# Patient Record
Sex: MALE | URBAN - METROPOLITAN AREA
[De-identification: names, ages, dates, MRNs, and addresses within clinical notes are randomized per-mention and may not be internally consistent; named-entity substitution may affect disease eponyms.]

---

## 2019-05-13 ENCOUNTER — PROCEDURE (OUTPATIENT)
Dept: URBAN - METROPOLITAN AREA CLINIC 17 | Facility: CLINIC | Age: 39
End: 2019-05-13
Payer: COMMERCIAL

## 2019-05-13 DIAGNOSIS — T15.01XA FOREIGN BODY IN CORNEA, RIGHT EYE, INITIAL ENCOUNTER: Primary | ICD-10-CM

## 2019-05-13 PROCEDURE — 65222 REMOVE FOREIGN BODY FROM EYE: CPT | Performed by: OPTOMETRIST

## 2019-05-13 ASSESSMENT — INTRAOCULAR PRESSURE
OS: 18
OD: 16

## 2019-05-13 NOTE — IMPRESSION/PLAN
Impression: Foreign body in cornea, right eye, initial encounter: T15.01xA. Plan: Removed FB from cornea, procedure tolerated well, continue to use ABX gtts given at urgent care.

## 2023-08-28 ENCOUNTER — LAB (OUTPATIENT)
Dept: LAB | Facility: LAB | Age: 43
End: 2023-08-28
Payer: COMMERCIAL

## 2023-08-29 LAB — TOBACCO SCREEN, URINE: NEGATIVE

## 2023-10-12 ENCOUNTER — ANCILLARY PROCEDURE (OUTPATIENT)
Dept: RADIOLOGY | Facility: CLINIC | Age: 43
End: 2023-10-12
Payer: COMMERCIAL

## 2023-10-12 ENCOUNTER — COMMUNITY ORDERS (OUTPATIENT)
Age: 43
End: 2023-10-12
Payer: COMMERCIAL

## 2023-10-12 DIAGNOSIS — S67.192A CRUSHING INJURY OF RIGHT MIDDLE FINGER, INITIAL ENCOUNTER: Primary | ICD-10-CM

## 2023-10-12 PROCEDURE — 73130 X-RAY EXAM OF HAND: CPT | Mod: RT

## 2024-05-21 ENCOUNTER — LAB (OUTPATIENT)
Dept: LAB | Facility: LAB | Age: 44
End: 2024-05-21
Payer: COMMERCIAL

## 2024-05-21 DIAGNOSIS — E55.9 VITAMIN D DEFICIENCY, UNSPECIFIED: ICD-10-CM

## 2024-05-21 DIAGNOSIS — R61 GENERALIZED HYPERHIDROSIS: ICD-10-CM

## 2024-05-21 DIAGNOSIS — F31.61 BIPOLAR DISORDER, CURRENT EPISODE MIXED, MILD (MULTI): Primary | ICD-10-CM

## 2024-05-21 LAB
ALBUMIN SERPL BCP-MCNC: 4.6 G/DL (ref 3.4–5)
ALP SERPL-CCNC: 61 U/L (ref 33–120)
ALT SERPL W P-5'-P-CCNC: 43 U/L (ref 10–52)
ANION GAP SERPL CALC-SCNC: 10 MMOL/L (ref 10–20)
AST SERPL W P-5'-P-CCNC: 30 U/L (ref 9–39)
BASOPHILS # BLD AUTO: 0.03 X10*3/UL (ref 0–0.1)
BASOPHILS NFR BLD AUTO: 0.5 %
BILIRUB SERPL-MCNC: 0.8 MG/DL (ref 0–1.2)
BUN SERPL-MCNC: 13 MG/DL (ref 6–23)
CALCIUM SERPL-MCNC: 8.9 MG/DL (ref 8.6–10.3)
CHLORIDE SERPL-SCNC: 104 MMOL/L (ref 98–107)
CHOLEST SERPL-MCNC: 141 MG/DL (ref 0–199)
CHOLESTEROL/HDL RATIO: 4.8
CO2 SERPL-SCNC: 27 MMOL/L (ref 21–32)
CREAT SERPL-MCNC: 1.21 MG/DL (ref 0.5–1.3)
CRP SERPL-MCNC: 0.41 MG/DL
EGFRCR SERPLBLD CKD-EPI 2021: 77 ML/MIN/1.73M*2
EOSINOPHIL # BLD AUTO: 0.13 X10*3/UL (ref 0–0.7)
EOSINOPHIL NFR BLD AUTO: 2.4 %
ERYTHROCYTE [DISTWIDTH] IN BLOOD BY AUTOMATED COUNT: 13.6 % (ref 11.5–14.5)
GLUCOSE SERPL-MCNC: 95 MG/DL (ref 74–99)
HCT VFR BLD AUTO: 50.3 % (ref 41–52)
HDLC SERPL-MCNC: 29.5 MG/DL
HGB BLD-MCNC: 16.5 G/DL (ref 13.5–17.5)
IMM GRANULOCYTES # BLD AUTO: 0.03 X10*3/UL (ref 0–0.7)
IMM GRANULOCYTES NFR BLD AUTO: 0.5 % (ref 0–0.9)
LDLC SERPL CALC-MCNC: 77 MG/DL
LYMPHOCYTES # BLD AUTO: 1.77 X10*3/UL (ref 1.2–4.8)
LYMPHOCYTES NFR BLD AUTO: 32.1 %
MCH RBC QN AUTO: 29.5 PG (ref 26–34)
MCHC RBC AUTO-ENTMCNC: 32.8 G/DL (ref 32–36)
MCV RBC AUTO: 90 FL (ref 80–100)
MONOCYTES # BLD AUTO: 0.45 X10*3/UL (ref 0.1–1)
MONOCYTES NFR BLD AUTO: 8.2 %
NEUTROPHILS # BLD AUTO: 3.11 X10*3/UL (ref 1.2–7.7)
NEUTROPHILS NFR BLD AUTO: 56.3 %
NON HDL CHOLESTEROL: 112 MG/DL (ref 0–149)
NRBC BLD-RTO: 0 /100 WBCS (ref 0–0)
PLATELET # BLD AUTO: 229 X10*3/UL (ref 150–450)
POTASSIUM SERPL-SCNC: 4.4 MMOL/L (ref 3.5–5.3)
PROT SERPL-MCNC: 7 G/DL (ref 6.4–8.2)
RBC # BLD AUTO: 5.6 X10*6/UL (ref 4.5–5.9)
SODIUM SERPL-SCNC: 137 MMOL/L (ref 136–145)
TRIGL SERPL-MCNC: 171 MG/DL (ref 0–149)
TSH SERPL-ACNC: 1.96 MIU/L (ref 0.44–3.98)
VLDL: 34 MG/DL (ref 0–40)
WBC # BLD AUTO: 5.5 X10*3/UL (ref 4.4–11.3)

## 2024-05-21 PROCEDURE — 84436 ASSAY OF TOTAL THYROXINE: CPT

## 2024-05-21 PROCEDURE — 84480 ASSAY TRIIODOTHYRONINE (T3): CPT

## 2024-05-21 PROCEDURE — 80175 DRUG SCREEN QUAN LAMOTRIGINE: CPT

## 2024-05-21 PROCEDURE — 36415 COLL VENOUS BLD VENIPUNCTURE: CPT

## 2024-05-21 PROCEDURE — 82607 VITAMIN B-12: CPT

## 2024-05-21 PROCEDURE — 86140 C-REACTIVE PROTEIN: CPT

## 2024-05-21 PROCEDURE — 80053 COMPREHEN METABOLIC PANEL: CPT

## 2024-05-21 PROCEDURE — 85025 COMPLETE CBC W/AUTO DIFF WBC: CPT

## 2024-05-21 PROCEDURE — 80061 LIPID PANEL: CPT

## 2024-05-21 PROCEDURE — 82306 VITAMIN D 25 HYDROXY: CPT

## 2024-05-21 PROCEDURE — 84443 ASSAY THYROID STIM HORMONE: CPT

## 2024-05-22 LAB
25(OH)D3 SERPL-MCNC: 21 NG/ML (ref 30–100)
LAMOTRIGINE SERPL-MCNC: 5.7 UG/ML (ref 2.5–15)
T3 SERPL-MCNC: 171 NG/DL (ref 60–200)
T4 SERPL-MCNC: 7.4 UG/DL (ref 4.5–11.1)
VIT B12 SERPL-MCNC: 364 PG/ML (ref 211–911)

## 2025-01-14 ENCOUNTER — HOSPITAL ENCOUNTER (EMERGENCY)
Facility: HOSPITAL | Age: 45
Discharge: HOME | End: 2025-01-14
Attending: FAMILY MEDICINE
Payer: COMMERCIAL

## 2025-01-14 VITALS
OXYGEN SATURATION: 98 % | HEIGHT: 71 IN | DIASTOLIC BLOOD PRESSURE: 84 MMHG | TEMPERATURE: 97.6 F | SYSTOLIC BLOOD PRESSURE: 148 MMHG | WEIGHT: 260 LBS | HEART RATE: 97 BPM | RESPIRATION RATE: 16 BRPM | BODY MASS INDEX: 36.4 KG/M2

## 2025-01-14 DIAGNOSIS — Z86.018 HISTORY OF LIPOMA: ICD-10-CM

## 2025-01-14 DIAGNOSIS — L03.312 CELLULITIS OF MID BACK REGION: Primary | ICD-10-CM

## 2025-01-14 PROCEDURE — 99283 EMERGENCY DEPT VISIT LOW MDM: CPT | Performed by: FAMILY MEDICINE

## 2025-01-14 PROCEDURE — 2500000002 HC RX 250 W HCPCS SELF ADMINISTERED DRUGS (ALT 637 FOR MEDICARE OP, ALT 636 FOR OP/ED)

## 2025-01-14 RX ORDER — LAMOTRIGINE 200 MG/1
1 TABLET ORAL
COMMUNITY
Start: 2024-09-10

## 2025-01-14 RX ORDER — BUPROPION HYDROCHLORIDE 300 MG/1
300 TABLET ORAL
COMMUNITY
Start: 2024-09-08

## 2025-01-14 RX ORDER — SULFAMETHOXAZOLE AND TRIMETHOPRIM 800; 160 MG/1; MG/1
1 TABLET ORAL 2 TIMES DAILY
Qty: 14 TABLET | Refills: 0 | Status: SHIPPED | OUTPATIENT
Start: 2025-01-14 | End: 2025-01-21

## 2025-01-14 RX ORDER — SULFAMETHOXAZOLE AND TRIMETHOPRIM 800; 160 MG/1; MG/1
1 TABLET ORAL ONCE
Status: COMPLETED | OUTPATIENT
Start: 2025-01-14 | End: 2025-01-14

## 2025-01-14 RX ORDER — SULFAMETHOXAZOLE AND TRIMETHOPRIM 800; 160 MG/1; MG/1
TABLET ORAL
Status: COMPLETED
Start: 2025-01-14 | End: 2025-01-14

## 2025-01-14 RX ADMIN — SULFAMETHOXAZOLE AND TRIMETHOPRIM 1 TABLET: 800; 160 TABLET ORAL at 15:32

## 2025-01-14 ASSESSMENT — COLUMBIA-SUICIDE SEVERITY RATING SCALE - C-SSRS
1. IN THE PAST MONTH, HAVE YOU WISHED YOU WERE DEAD OR WISHED YOU COULD GO TO SLEEP AND NOT WAKE UP?: NO
6. HAVE YOU EVER DONE ANYTHING, STARTED TO DO ANYTHING, OR PREPARED TO DO ANYTHING TO END YOUR LIFE?: NO
2. HAVE YOU ACTUALLY HAD ANY THOUGHTS OF KILLING YOURSELF?: NO

## 2025-01-14 ASSESSMENT — PAIN - FUNCTIONAL ASSESSMENT
PAIN_FUNCTIONAL_ASSESSMENT: 0-10
PAIN_FUNCTIONAL_ASSESSMENT: 0-10

## 2025-01-14 ASSESSMENT — PAIN DESCRIPTION - PAIN TYPE: TYPE: ACUTE PAIN

## 2025-01-14 ASSESSMENT — PAIN DESCRIPTION - LOCATION: LOCATION: BACK

## 2025-01-14 ASSESSMENT — PAIN SCALES - GENERAL
PAINLEVEL_OUTOF10: 0 - NO PAIN
PAINLEVEL_OUTOF10: 2

## 2025-01-14 ASSESSMENT — PAIN DESCRIPTION - DESCRIPTORS: DESCRIPTORS: PRESSURE

## 2025-01-14 NOTE — DISCHARGE INSTRUCTIONS
You are noticed the debris tissue from the what you described to be previous lipoma without any drainable abscess there is localized cellulitis and infection of the skin with with previously present lipoma by her new history.  You need to follow-up with a surgeon for surgical excision.  Due to cellulitis you been put on antibiotic and follow-up with Dr. Miller general surgeon at [facility] or follow-up with general surgery at [facility].  If develop any fever chills worsening symptoms or new symptom please return to ER immediately.

## 2025-01-14 NOTE — ED PROVIDER NOTES
HPI   Chief Complaint   Patient presents with   • Abscess     Abscess for few days. Pressure around a lipoma a few days ago and now abscess that is draining        HPI  Patient history of lipoma of the upper back in the lower thoracic, lumbar region nausea some drainage and redness from the area.  He denies any trauma fall injury.  Denies any chest pain fever chills nausea vomiting diarrhea.  His shots are up-to-date generally have not helped she is here for antibiotic treatment.  His wife works as a nursing supervisor at [employer].  She advised him to come to ER for antibiotic prescription.  Patient himself is a  he has been in good health denies any tobacco use or drug abuse.  Generally has been good health.  His shots are up-to-date.    Family history: Reviewed  Social history: Reviewed, as described above.    Review of system: 10 review of system obtained review of system described in HPI otherwise negative.        Patient History   History reviewed. No pertinent past medical history.  History reviewed. No pertinent surgical history.  No family history on file.  Social History     Tobacco Use   • Smoking status: Never   • Smokeless tobacco: Never   Substance Use Topics   • Alcohol use: Never   • Drug use: Never       Physical Exam   ED Triage Vitals [01/14/25 1449]   Temperature Heart Rate Respirations BP   36.4 °C (97.5 °F) (!) 103 16 (!) 153/94      SpO2 Temp Source Heart Rate Source Patient Position   97 % Oral -- --      BP Location FiO2 (%)     -- --       Physical Exam  Constitutional:       Appearance: Normal appearance.      Comments: Patient awake alert pleasant cooperative well-developed well-nourished without acute distress.  Alert oriented x 3.  Did not look sick toxic distress.  Noted to have a localized but appeared to be necrotic tissue type drainage from the previously present lipoma type lesion of the right side of the paraspinal thoracic region lower thoracic spine region without  involving central area it was nonfixed and mobile area of localized edematous slightly erythematous area is opening where he has drainage of what appeared to be necrotic fatty tissue without any liquid drainage or any bleeding.  I was able to clean the area with small amount of necrotic tissue type take whitish tissue which was cleaned.  He was noted localized erythema 2 and half centimeter diameter with mild edema.  Cervical thoracic lumbar spine nontender neck was supple.  The lesion did not appear to be fixed as it was movable.  No other rashes noted.   HENT:      Head: Normocephalic and atraumatic.      Right Ear: External ear normal.      Left Ear: External ear normal.      Nose: Nose normal. No congestion or rhinorrhea.   Eyes:      Extraocular Movements: Extraocular movements intact.      Conjunctiva/sclera: Conjunctivae normal.      Pupils: Pupils are equal, round, and reactive to light.   Cardiovascular:      Rate and Rhythm: Normal rate and regular rhythm.      Pulses: Normal pulses.      Heart sounds: Normal heart sounds.   Pulmonary:      Effort: Pulmonary effort is normal.      Breath sounds: Normal breath sounds.   Abdominal:      General: Abdomen is flat. Bowel sounds are normal.      Palpations: Abdomen is soft.   Musculoskeletal:      Cervical back: Normal range of motion and neck supple.   Skin:     General: Skin is warm.      Capillary Refill: Capillary refill takes less than 2 seconds.   Neurological:      General: No focal deficit present.      Mental Status: He is alert and oriented to person, place, and time.   Psychiatric:         Mood and Affect: Mood normal.         Behavior: Behavior normal.           ED Course & MDM   Diagnoses as of 01/14/25 1528   Cellulitis of mid back region   History of lipoma                 No data recorded                                 Medical Decision Making      Procedure  Procedures     Hang Fitzgerald MD  01/14/25 1520

## 2025-01-14 NOTE — ED TRIAGE NOTES
Pt has what he thinks is a lipoma on his back that began to have pressure a few days ago. He now thinks he has an abscess that began draining today. Feels pain with anything touching it and increased pressure at site.

## 2025-01-15 ENCOUNTER — PATIENT OUTREACH (OUTPATIENT)
Dept: CARE COORDINATION | Facility: CLINIC | Age: 45
End: 2025-01-15
Payer: COMMERCIAL

## 2025-01-15 NOTE — PROGRESS NOTES
[facility]  outreach.    Spoke with member. I introduced myself and purpose of call.  Confirmed : Yes  No new or worsening symptoms. Notes improvement. No pain following the I&D. Some itching.  Member has Rx given at discharge. Reviewed importance of taking with full glass of water.  States having received the AVS.  Member doesn't have PCP. Understands importance of establishing care.  Encouraged to establish care with PCP. New pt appts are currentl;y out to 2025. Spouse is  employee. If they need assistance scheduling offered my assistance.   No questions or concerns at this time.  Available for assistance in future if needed.  Bonnie DAMON, [facility] Population [facility]  Office Phone: [phone]     Engagement  Call Start Time: 1249 (1/15/2025 12:52 PM)    Medications  Medications reviewed with patient/caregiver?: Yes (1/15/2025 12:52 PM)  Is the patient having any side effects they believe may be caused by any medication additions or changes?: No (1/15/2025 12:52 PM)  Does the patient have all medications ordered at discharge?: Yes (1/15/2025 12:52 PM)  Care Management Interventions: No intervention needed (1/15/2025 12:52 PM)  Is the patient taking all medications as directed (includes completed medication regime)?: Yes (1/15/2025 12:52 PM)    Appointments  Does the patient have a primary care provider?: No (Encouraged to establish care with PCP. Ne pt appts are currentl;y out to 2025. Spouse is  employee. If they need assistance scheduling offered my assistance.) (1/15/2025 12:52 PM)  Has the patient kept scheduled appointments due by today?: Not applicable (1/15/2025 12:52 PM)    Patient Teaching  Does the patient have access to their discharge instructions?: Yes (1/15/2025 12:52 PM)  What is the patient's perception of their health status since discharge?: Improving (1/15/2025 12:52 PM)

## 2025-04-22 ENCOUNTER — APPOINTMENT (OUTPATIENT)
Dept: PRIMARY CARE | Facility: CLINIC | Age: 45
End: 2025-04-22
Payer: COMMERCIAL

## 2025-04-22 VITALS
OXYGEN SATURATION: 95 % | HEIGHT: 71 IN | DIASTOLIC BLOOD PRESSURE: 98 MMHG | BODY MASS INDEX: 35.87 KG/M2 | TEMPERATURE: 97.6 F | WEIGHT: 256.2 LBS | HEART RATE: 104 BPM | SYSTOLIC BLOOD PRESSURE: 136 MMHG

## 2025-04-22 DIAGNOSIS — F31.78 BIPOLAR DISORDER, IN FULL REMISSION, MOST RECENT EPISODE MIXED (MULTI): ICD-10-CM

## 2025-04-22 DIAGNOSIS — F31.62 BIPOLAR DISORDER, CURRENT EPISODE MIXED, MODERATE (MULTI): ICD-10-CM

## 2025-04-22 DIAGNOSIS — F31.61 BIPOLAR DISORDER, CURRENT EPISODE MIXED, MILD (MULTI): ICD-10-CM

## 2025-04-22 DIAGNOSIS — G47.33 OSA (OBSTRUCTIVE SLEEP APNEA): Primary | ICD-10-CM

## 2025-04-22 PROCEDURE — 3008F BODY MASS INDEX DOCD: CPT

## 2025-04-22 PROCEDURE — 99204 OFFICE O/P NEW MOD 45 MIN: CPT

## 2025-04-22 ASSESSMENT — PATIENT HEALTH QUESTIONNAIRE - PHQ9
2. FEELING DOWN, DEPRESSED OR HOPELESS: NOT AT ALL
SUM OF ALL RESPONSES TO PHQ9 QUESTIONS 1 AND 2: 0
1. LITTLE INTEREST OR PLEASURE IN DOING THINGS: NOT AT ALL

## 2025-04-22 NOTE — PATIENT INSTRUCTIONS
Allegra in the morning, zrtec at night  CPAP orders  Abscess looks good!    CPAP sleep follow up in 3 months.    Thank you for coming in today, if any questions or concerns arise, please call my office.   SERENA Beauchamp-CNP

## 2025-04-22 NOTE — PROGRESS NOTES
Subjective   Patient ID: Brayan Kinney is a 44 y.o. male who presents for New Patient Visit (Establishing. ), ER Follow-up (Was in the ER in [place] on 1/14/25 for an abscess on his back. He reports the abscess is almost healed. No leaking, but notices some discoloration. ), cpap (Machine is not working, the company is from when he was out in [place] years ago. He would like to get established with new sleep study and provider. ), and Ear Fullness (R ear. Asking for drops to help with wax build up).      HPI    Obstructive sleep apnea  This is a face to face for CPAP orders  Needs supplies tubes and face mask.  Auto pap 5-20cm H20.  He likely will need a new sleep study to evaluate this    History of seasonal allergies.  Taking Claritin, recommend Allegra in the morning and cetirizine at night    Cutaneous abscess  Abscess is essentially resolved  No tunneling.  No redness no swelling, recommend monitor for now no need for general surgery referral      Vitals:    04/22/25 1411   BP: (!) 136/98   Pulse: 104   Temp: 36.4 °C (97.6 °F)   SpO2: 95%       Review of Systems   Constitutional:  Negative for activity change, fatigue, fever and unexpected weight change.   HENT: Negative.     Respiratory: Negative.  Negative for shortness of breath.    Cardiovascular: Negative.  Negative for chest pain.   Gastrointestinal: Negative.  Negative for abdominal pain.   Endocrine: Negative.    Musculoskeletal: Negative.    Skin: Negative.    Allergic/Immunologic: Negative.    Neurological:  Negative for dizziness, weakness and headaches.   Psychiatric/Behavioral: Negative.         Objective   Physical Exam  Vitals and nursing note reviewed.   Constitutional:       Appearance: Normal appearance. He is obese.   HENT:      Head: Normocephalic.      Mouth/Throat:      Mouth: Mucous membranes are moist.   Cardiovascular:      Rate and Rhythm: Normal rate and regular rhythm.      Pulses: Normal pulses.      Heart sounds: Normal heart sounds.  No murmur heard.     No friction rub. No gallop.   Pulmonary:      Effort: Pulmonary effort is normal. No respiratory distress.      Breath sounds: Normal breath sounds. No wheezing.   Abdominal:      General: Bowel sounds are normal. There is no distension.      Palpations: Abdomen is soft.      Tenderness: There is no abdominal tenderness.   Musculoskeletal:         General: No deformity. Normal range of motion.   Skin:     General: Skin is warm and dry.      Capillary Refill: Capillary refill takes less than 2 seconds.             Comments: Abscess of back resolved   Neurological:      General: No focal deficit present.      Mental Status: He is alert and oriented to person, place, and time.   Psychiatric:         Mood and Affect: Mood normal.         Assessment/Plan   Problem List Items Addressed This Visit       Bipolar disorder, current episode mixed, mild (Multi)    Bipolar disorder, current episode mixed, moderate (Multi)    Bipolar disorder, in full remission, most recent episode mixed (Multi)     Other Visit Diagnoses         JOSEMANUEL (obstructive sleep apnea)    -  Primary    Relevant Orders    Home sleep apnea test (HSAT)                 Thank you for coming in today, please call my office if you have any concerns or questions.     Kenneth LYONS, CNP

## 2025-04-23 ASSESSMENT — ENCOUNTER SYMPTOMS
PSYCHIATRIC NEGATIVE: 1
HEADACHES: 0
MUSCULOSKELETAL NEGATIVE: 1
UNEXPECTED WEIGHT CHANGE: 0
FEVER: 0
ACTIVITY CHANGE: 0
RESPIRATORY NEGATIVE: 1
CARDIOVASCULAR NEGATIVE: 1
ABDOMINAL PAIN: 0
ENDOCRINE NEGATIVE: 1
GASTROINTESTINAL NEGATIVE: 1
DIZZINESS: 0
FATIGUE: 0
ALLERGIC/IMMUNOLOGIC NEGATIVE: 1
WEAKNESS: 0
SHORTNESS OF BREATH: 0

## 2025-05-13 ENCOUNTER — CLINICAL SUPPORT (OUTPATIENT)
Dept: SLEEP MEDICINE | Facility: CLINIC | Age: 45
End: 2025-05-13
Payer: COMMERCIAL

## 2025-05-13 VITALS — DIASTOLIC BLOOD PRESSURE: 83 MMHG | HEART RATE: 81 BPM | SYSTOLIC BLOOD PRESSURE: 126 MMHG

## 2025-05-13 DIAGNOSIS — G47.33 OSA (OBSTRUCTIVE SLEEP APNEA): ICD-10-CM

## 2025-05-14 NOTE — PROGRESS NOTES
Type of Study: HOME SLEEP STUDY- NOMAD      The patient received equipment and instructions for use of the [employer] Nomad HSAT device. The patient was instructed how to apply the effort belts, cannula, thermistor. It was also explained how the Nomad and oximeter components work.  The patient was asked to record their sleep for an 8-hour period.    The patient was informed of their responsibility for the device and acknowledged this by signing the HSAT device contract. The patient was asked to return the device on 05- to [facility].    The patient was instructed to call 911 as usual for any medical- emergencies while at home.  The patient was also given a phone number for troubleshooting when using the device in case there were additional questions.      Caitlyn Delgadillo, RRT-SDS

## 2025-05-27 DIAGNOSIS — G47.30 SLEEP APNEA, UNSPECIFIED TYPE: Primary | ICD-10-CM

## 2025-05-28 DIAGNOSIS — G47.33 OSA (OBSTRUCTIVE SLEEP APNEA): Primary | ICD-10-CM

## 2025-06-08 ENCOUNTER — APPOINTMENT (OUTPATIENT)
Dept: SLEEP MEDICINE | Facility: CLINIC | Age: 45
End: 2025-06-08
Payer: COMMERCIAL

## 2025-07-22 ENCOUNTER — APPOINTMENT (OUTPATIENT)
Dept: PRIMARY CARE | Facility: CLINIC | Age: 45
End: 2025-07-22
Payer: COMMERCIAL